# Patient Record
Sex: FEMALE | Race: WHITE
[De-identification: names, ages, dates, MRNs, and addresses within clinical notes are randomized per-mention and may not be internally consistent; named-entity substitution may affect disease eponyms.]

---

## 2019-04-27 ENCOUNTER — HOSPITAL ENCOUNTER (EMERGENCY)
Dept: HOSPITAL 91 - FTE | Age: 39
LOS: 1 days | Discharge: HOME | End: 2019-04-28
Payer: SELF-PAY

## 2019-04-27 ENCOUNTER — HOSPITAL ENCOUNTER (EMERGENCY)
Dept: HOSPITAL 10 - FTE | Age: 39
LOS: 1 days | Discharge: HOME | End: 2019-04-28
Payer: SELF-PAY

## 2019-04-27 VITALS
WEIGHT: 177.69 LBS | HEIGHT: 62 IN | WEIGHT: 177.69 LBS | HEIGHT: 62 IN | BODY MASS INDEX: 32.7 KG/M2 | BODY MASS INDEX: 32.7 KG/M2

## 2019-04-27 DIAGNOSIS — S91.311A: Primary | ICD-10-CM
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
## 2019-04-27 DIAGNOSIS — Z23: ICD-10-CM
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
## 2019-04-27 PROCEDURE — 90471 IMMUNIZATION ADMIN: CPT

## 2019-04-27 PROCEDURE — 90715 TDAP VACCINE 7 YRS/> IM: CPT

## 2019-04-27 PROCEDURE — 99283 EMERGENCY DEPT VISIT LOW MDM: CPT

## 2019-04-27 PROCEDURE — 73630 X-RAY EXAM OF FOOT: CPT

## 2019-04-27 PROCEDURE — 81025 URINE PREGNANCY TEST: CPT

## 2019-04-28 VITALS — SYSTOLIC BLOOD PRESSURE: 134 MMHG | HEART RATE: 73 BPM | RESPIRATION RATE: 18 BRPM | DIASTOLIC BLOOD PRESSURE: 95 MMHG

## 2019-04-28 RX ADMIN — LIDOCAINE HYDROCHLORIDE 1 ML: 10 INJECTION, SOLUTION INFILTRATION; PERINEURAL at 01:30

## 2019-04-28 RX ADMIN — CLOSTRIDIUM TETANI TOXOID ANTIGEN (FORMALDEHYDE INACTIVATED), CORYNEBACTERIUM DIPHTHERIAE TOXOID ANTIGEN (FORMALDEHYDE INACTIVATED), BORDETELLA PERTUSSIS TOXOID ANTIGEN (GLUTARALDEHYDE INACTIVATED), BORDETELLA PERTUSSIS FILAMENTOUS HEMAGGLUTININ ANTIGEN (FORMALDEHYDE INACTIVATED), BORDETELLA PERTUSSIS PERTACTIN ANTIGEN, AND BORDETELLA PERTUSSIS FIMBRIAE 2/3 ANTIGEN 1 ML: 5; 2; 2.5; 5; 3; 5 INJECTION, SUSPENSION INTRAMUSCULAR at 01:05

## 2019-04-28 RX ADMIN — AMOXICILLIN AND CLAVULANATE POTASSIUM 1 MG: 875; 125 TABLET, FILM COATED ORAL at 01:20

## 2019-04-28 NOTE — ERD
ER Documentation


Chief Complaint


Chief Complaint





DOG BITE TODAY, BLEEDING CONTROLLED





HPI


Is a 39-year-old female patient who presents to the emergency room with 


complaint of laceration to right medial posterior foot due to dog bite occurring


approximately 6 hours PTA.  This was her own dog she was breaking up a fight.  


Patient able to ambulate with pain.  No other chronic medical conditions.  Tdap 


needs updated.





ROS


All systems reviewed and are negative except as per history of present illness.





Medications


Home Meds


Active Scripts


Ibuprofen* (Motrin*) 600 Mg Tab, 600 MG PO Q6, #30 TAB


   Prov:SHILA WEBSTER NP         4/28/19


Amoxicillin/Potassium Clav (Amox-Clav 875-125 mg Tablet) 875-125 mg Tab, 1 TAB 


PO BID for 7 Days, #14 TAB


   Prov:SHILA WEBSTER NP         4/28/19





Allergies


Allergies:  


Coded Allergies:  


     No Known Allergies (Verified  Allergy, Unknown, 4/27/19)





PMhx/Soc


Medical and Surgical Hx:  pt denies Medical Hx, pt denies Surgical Hx


Hx Alcohol Use:  No


Hx Substance Use:  No


Hx Tobacco Use:  No


Smoking Status:  Never smoker





FmHx


Family History:  No diabetes, No coronary disease, No other





Physical Exam


Vitals





Vital Signs


  Date      Temp  Pulse  Resp  B/P (MAP)   Pulse Ox  O2          O2 Flow    FiO2


Time                                                 Delivery    Rate


   4/28/19  98.3     73    18      134/95       100  Room Air


     04:03                          (108)


   4/27/19  98.7     73    16      145/85        98


     20:18                          (105)





Physical Exam


Const:   No acute distress


Head:   Atraumatic 


Eyes:    Normal Conjunctiva


ENT:    Normal External Ears, Nose and Mouth.


Neck:               Full range of motion. No meningismus.


Resp:   Clear to auscultation bilaterally


Cardio:   Regular rate and rhythm, no murmurs


Abd:    Soft, non tender, non distended. Normal bowel sounds


Skin:   No petechiae or rashes, abrasion to right distal anterior lower leg, 2 


cm laceration with minor avulsion to right posterior foot, mild swelling, 


+tender, +csm, 5/5 flex/ext, sensation intact  


Neur:   Awake and alert


Psych:    Normal Mood and Affect


Results 24 hrs





Laboratory Tests


                    Test
                      4/28/19
00:58


                    POC Beta HCG, Qualitative  NEGATIVE





Current Medications


 Medications
   Dose
          Sig/Ted
       Start Time
   Status  Last


 (Trade)       Ordered        Route
 PRN     Stop Time              Admin
Dose


                              Reason                                Admin


 Diphtheria/
   0.5 ml         ONCE ONCE
     4/28/19       DC           4/28/19


Tetanus/Acell                 IM*
           01:00
                       01:05




 Pertussis
                                 4/28/19 01:01


(Adacel)


                875 mg         ONCE  ONCE
    4/28/19       DC           4/28/19


Amoxicillin/
                 PO
            01:00
                       01:20



Clavulanate
                                 4/28/19 01:01


Potassium



(Augmentin)


 Lidocaine
     20 ml          ONCE  ONCE
    4/28/19       DC       



(Xylocaine                    SC
            01:30



1%
  (Mdv) 20                                4/28/19 01:58


ml)








Procedures/MDM


This is a 39-year-old female patient who presents the emergency room with dog 


bite to right foot.





ED COURSE:


The patient was stable throughout ED course. I kept the patient and/or family 


informed of laboratory and diagnostic imaging results throughout the ED course. 








 


DIAGNOSTIC IMAGING:


Soft tissue gas and soft tissue swelling at the posterior medial aspect of the 


distal tibia likely corresponds to the site of the dog bite. Recommend clinical 


correlation for signs of necrotizing soft tissue infection. Negative for 


evidence of acute fracture in the foot. The patient may benefit from x-rays of t


he right ankle.


Read by radiologist.





PROCEDURES:


Laceration Repair by me:


Anesthesia:         none


Location:         right medial posterior foot


Tendon/Joint/Nerves:      No injury


Foreign body:         None detected after copious irrigation and exploration


Technique:         3 steri strips, edges not completely approximated, 2mm gap- 


chosen due to animal bite, subcut fat avulsion, and increased possibility for 


infection


Complexity:         No subcutaneous sutures/mucosal repair/edge excision, minor 


debridement of subcut fat


Post Closure Length:      2 cm





Patient's bleeding was easily controlled in the department. RLE placed in 


supportive splint. +csm after placement, with instructions on care of splint and


wound care. 








MEDICATIONS GIVEN: 


Tdap, Augmentin, declined analgesic





MDM:


No evidence of compartment syndrome, neurologic injury, vascular injury, open 


joint, tendon laceration, or foreign body. 


Patient is appropriate for outpatient follow up. 


48 hour wound check.  Scar minimization instructions given.








DISPOSITION: The patient has been discharge home to follow-up with community 


physician.





Departure


Diagnosis:  


   Primary Impression:  


   Bite wound


Condition:  Stable


Patient Instructions:  Animal Bite, General


Referrals:  


[facility]





Additional Instructions:  


Thank you very much for allowing us to participate in your care. 


Your health and safety is our top priority at [facility].





Call your primary care doctor TOMORROW for an appointment during the next 2-4 


days and bring all the information and medications prescribed. 





Have prescriptions filled and follow precisely the directions on the label. 





If the symptoms get worse and your provider is unavailable, return to the 


Emergency Department immediately.





COMPLETE ENTIRE COURSE OF ANTIBIOTICS





HAVE WOUND CHECKED IN 2 DAYS EITHER HERE AT THIS ER OR AT URGENT CARE OR YOUR 


PRIMARY CARE PHYSICIAN





KEEP WOUND CLEAN AND DRY





RETURN FOR FEVER, SWELLING, PAIN, INCREASED DRAINAGE











SHILA WEBSETR NP              Apr 28, 2019 03:51